# Patient Record
Sex: FEMALE | Race: OTHER | NOT HISPANIC OR LATINO | URBAN - METROPOLITAN AREA
[De-identification: names, ages, dates, MRNs, and addresses within clinical notes are randomized per-mention and may not be internally consistent; named-entity substitution may affect disease eponyms.]

---

## 2020-09-14 ENCOUNTER — EMERGENCY (EMERGENCY)
Facility: HOSPITAL | Age: 30
LOS: 1 days | Discharge: HOME | End: 2020-09-14
Attending: EMERGENCY MEDICINE
Payer: COMMERCIAL

## 2020-09-14 VITALS
HEART RATE: 78 BPM | HEIGHT: 63 IN | TEMPERATURE: 98 F | DIASTOLIC BLOOD PRESSURE: 75 MMHG | OXYGEN SATURATION: 99 % | SYSTOLIC BLOOD PRESSURE: 126 MMHG | RESPIRATION RATE: 18 BRPM

## 2020-09-14 PROCEDURE — 99283 EMERGENCY DEPT VISIT LOW MDM: CPT | Mod: 25

## 2020-09-14 PROCEDURE — 73130 X-RAY EXAM OF HAND: CPT

## 2020-09-14 PROCEDURE — 73130 X-RAY EXAM OF HAND: CPT | Mod: 26,RT

## 2020-09-14 PROCEDURE — 29130 APPL FINGER SPLINT STATIC: CPT | Mod: F5

## 2020-09-14 RX ORDER — IBUPROFEN 200 MG
600 TABLET ORAL ONCE
Refills: 0 | Status: COMPLETED | OUTPATIENT
Start: 2020-09-14 | End: 2020-09-14

## 2020-09-14 RX ADMIN — Medication 600 MILLIGRAM(S): at 13:31

## 2020-09-14 NOTE — ED PROVIDER NOTE - PHYSICAL EXAMINATION
Right thumb full range of motion   No joint laxity at the MCP or the interphalangeal joint  Flexion and extension intact against resistance  Capillary refill <2 seconds  No erythema, induration, streaking, or open wounds   No distal radial or distal ulnar tenderness  Positive Finkelstein test

## 2020-09-14 NOTE — ED PROVIDER NOTE - PATIENT PORTAL LINK FT
You can access the FollowMyHealth Patient Portal offered by [facility] by registering at the following website: [email or web address]. By joining [facility]’s FollowMyHealth portal, you will also be able to view your health information using other applications (apps) compatible with our system.

## 2020-09-14 NOTE — ED PROVIDER NOTE - PROGRESS NOTE DETAILS
XR negative. Thumb spica applied. Dx thumb sprain. Will dc. Patient will follow up with [facility] tomorrow. Pt is well appearing walking with steady gait, stable for discharge and follow up without fail with medical doctor. I had a detailed discussion with the patient and/or guardian regarding the historical points, exam findings, and any diagnostic results supporting the discharge diagnosis. Pt educated on care and need for follow up. Strict return instructions and red flag signs and symptoms discussed with patient. Questions answered. Pt shows understanding of discharge information and agrees to follow. Diego Hampton was the scribe working with Dr. Camejo on 9/14/2020. I, Beckie Melton, am attesting to the fact that Diego Hampton worked with Dr. Srikanth Camejo on 9/14/2020 providing scribe services.

## 2020-09-14 NOTE — ED PROVIDER NOTE - CARE PLAN
Principal Discharge DX:	Sprain of metacarpophalangeal (MCP) joint of right thumb, initial encounter

## 2020-09-14 NOTE — ED PROVIDER NOTE - ATTENDING CONTRIBUTION TO CARE
I completed an independent physical examination.   I have signed out the follow up of any pending tests (i.e. labs, radiological studies) to the PA/NP.  I have discussed the patient’s plan of care and disposition with the PA/NP    Patient with thumb injury. X-ray, splint, outpatient follow up.

## 2020-09-14 NOTE — ED PROVIDER NOTE - CLINICAL SUMMARY MEDICAL DECISION MAKING FREE TEXT BOX
Neurovascularly intact. History and exam suggestive of a thumb sprain at the MCP. Low suspicion of a ligament tear. Will obtain x-ray to rule out a fracture, apply thumb SPICA, give Ibuprofen for pain, and will discharge.

## 2020-09-14 NOTE — ED ADULT NURSE NOTE - CHIEF COMPLAINT QUOTE
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

## 2020-09-14 NOTE — ED ADULT TRIAGE NOTE - CHIEF COMPLAINT QUOTE
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

## 2020-09-14 NOTE — ED PROVIDER NOTE - NSFOLLOWUPINSTRUCTIONS_ED_ALL_ED_FT
Follow up with the [facility] occupational health tomorrow. If pain persists after 10 days you should see an orthopedist.  For pain you can take over the counter Ibuprofen 600 mg orally every 6 hours as needed for pain. Take medication with food.   If you experience any new or worsening symptoms or if you are concerned you can always come back to the emergency for a re-evaluation. Follow up with the [facility] occupational health tomorrow. If pain persists after 10 days you should see an orthopedist.  For pain you can take over the counter Ibuprofen 600 mg orally every 6 hours as needed for pain. Take medication with food.   If you experience any new or worsening symptoms or if you are concerned you can always come back to the emergency for a re-evaluation.      Thumb Sprain       A thumb sprain is an injury to one of the bands of tissue (ligaments) that connect the bones in your thumb. The ligament may be stretched too much, or it may be torn. A tear can be either partial or complete. How bad, or severe, the sprain is depends on how much of the ligament was damaged or torn.      What are the causes?    A thumb sprain is often caused by a fall or an accident, such as when you hold your hands out to catch something or to protect yourself.      What increases the risk?  This injury is more likely to occur in people who play sports that involve:  •A risk of falling, such as skiing.      •Catching an object, such as basketball.        What are the signs or symptoms?  Symptoms of this condition include:  •Not being able to move the thumb normally.      •Swelling.      •Tenderness.      •Bruising.        How is this diagnosed?  This condition may be diagnosed based on:  •Your symptoms and medical history. Your health care provider may ask about any recent injuries to your thumb.      • A physical exam.       •Imaging studies such as X-ray, ultrasound, or MRI.        How is this treated?  Treatment for this condition depends on how severe your sprain is.  •If your ligament is overstretched or partially torn, treatment usually involves keeping your thumb in a fixed position (immobilization) for at least 4 to 6 weeks. Your health care provider will apply a bandage, cast, or splint to keep your thumb from moving until it heals.      •If your ligament is fully torn, you may need surgery to reconnect the ligament to the bone. After surgery, you will need to wear a cast or splint on your thumb.      Your health care provider may also recommend physical therapy to strengthen your thumb.      Follow these instructions at home:    If you have a splint or bandage:     •Wear the splint or bandage as told by your health care provider. Remove it only as told by your health care provider.      •Loosen the splint or bandage if your thumb or fingers tingle, become numb, or turn cold and blue.       •Keep the splint or bandage clean and dry.      If you have a cast:     • Do not stick anything inside the cast to scratch your skin. Doing that increases your risk of infection.      •Check the skin around the cast every day. Tell your health care provider about any concerns.       •You may put lotion on dry skin around the edges of the cast. Do not put lotion on the skin underneath the cast.       •Keep the cast clean and dry.      Bathing     • Do not take baths, swim, or use a hot tub until your health care provider approves. Ask your health care provider if you may take showers. You may only be allowed to take sponge baths.    •If your splint, bandage, or cast is not waterproof:  •Do not let it get wet.      •Cover it with a watertight covering to protect it from water when you take a bath or shower.          Managing pain, stiffness, and swelling    •If directed, put ice on your thumb:  •If you have a removable splint, remove it as told by your health care provider.      •Put ice in a plastic bag.      •Place a towel between your skin and the bag, or between your cast and the bag.      •Leave the ice on for 20 minutes, 2–3 times a day.        •Move your fingers often to avoid stiffness and to lessen swelling.      •Raise (elevate) your hand above the level of your heart while you are sitting or lying down.      Activity     •Return to your normal activities as told by your health care provider. Ask your health care provider what activities are safe for you.     •Do physical therapy exercises as directed. After your splint or cast is removed, your health care provider may recommend that you:  •Move your thumb in circles.      • Touch your thumb to your pinky finger.      • Do these exercises several times a day.         •Ask your health care provider if you may use a hand exerciser to strengthen your muscles.       •If your thumb feels stiff while you are exercising it, try doing the exercises while soaking your hand in warm water.      Driving     • Do not drive until your health care provider approves.       • Do not drive or use heavy machinery while taking prescription pain medicine.      General instructions     • Do not put pressure on any part of the cast or splint until it is fully hardened, if applicable. This may take several hours.      • Take over-the-counter and prescription medicines only as told by your health care provider.       • Do not use any products that contain nicotine or tobacco, such as cigarettes and e-cigarettes. These can delay healing. If you need help quitting, ask your health care provider.      • Do not wear rings on your injured thumb.      • Keep all follow-up visits as told by your health care provider. This is important.        Contact a health care provider if you have:    •Pain that gets worse or does not get better with medicine.      •Bruising or swelling that gets worse.      •Your cast or splint is damaged.        Get help right away if:    •Your thumb feels numb, tingles, turns cold, or turns blue, even after loosening your splint or bandage (if applicable).        Summary    •A thumb sprain is an injury to one of the bands of tissue (ligaments) that connect the bones in your thumb.      •Thumb sprains are more likely to occur in people who play sports that involve a risk of falling or having to catch an object.      •Treatment will depend on how severe the sprain is, but it will require keeping the thumb in a fixed position. It might require surgery.      •Make sure you understand and follow all of your health care provider's instructions for home care.      This information is not intended to replace advice given to you by your health care provider. Make sure you discuss any questions you have with your health care provider.

## 2021-03-19 ENCOUNTER — EMERGENCY (EMERGENCY)
Facility: HOSPITAL | Age: 31
LOS: 1 days | Discharge: HOME | End: 2021-03-19
Attending: EMERGENCY MEDICINE
Payer: COMMERCIAL

## 2021-03-19 VITALS
WEIGHT: 160.06 LBS | OXYGEN SATURATION: 98 % | HEART RATE: 74 BPM | SYSTOLIC BLOOD PRESSURE: 131 MMHG | RESPIRATION RATE: 16 BRPM | HEIGHT: 63 IN | TEMPERATURE: 98 F | DIASTOLIC BLOOD PRESSURE: 82 MMHG

## 2021-03-19 PROCEDURE — 99283 EMERGENCY DEPT VISIT LOW MDM: CPT

## 2021-03-19 PROCEDURE — 90471 IMMUNIZATION ADMIN: CPT

## 2021-03-19 PROCEDURE — 99283 EMERGENCY DEPT VISIT LOW MDM: CPT | Mod: 25

## 2021-03-19 PROCEDURE — 73140 X-RAY EXAM OF FINGER(S): CPT | Mod: 26,RT

## 2021-03-19 PROCEDURE — 73140 X-RAY EXAM OF FINGER(S): CPT

## 2021-03-19 PROCEDURE — 90715 TDAP VACCINE 7 YRS/> IM: CPT

## 2021-03-19 RX ORDER — ACETAMINOPHEN 500 MG
650 TABLET ORAL ONCE
Refills: 0 | Status: COMPLETED | OUTPATIENT
Start: 2021-03-19 | End: 2021-03-19

## 2021-03-19 RX ORDER — TETANUS TOXOID, REDUCED DIPHTHERIA TOXOID AND ACELLULAR PERTUSSIS VACCINE, ADSORBED 5; 2.5; 8; 8; 2.5 [IU]/.5ML; [IU]/.5ML; UG/.5ML; UG/.5ML; UG/.5ML
0.5 SUSPENSION INTRAMUSCULAR ONCE
Refills: 0 | Status: COMPLETED | OUTPATIENT
Start: 2021-03-19 | End: 2021-03-19

## 2021-03-19 RX ADMIN — TETANUS TOXOID, REDUCED DIPHTHERIA TOXOID AND ACELLULAR PERTUSSIS VACCINE, ADSORBED 0.5 MILLILITER(S): 5; 2.5; 8; 8; 2.5 SUSPENSION INTRAMUSCULAR at 09:42

## 2021-03-19 RX ADMIN — Medication 650 MILLIGRAM(S): at 09:41

## 2021-03-19 NOTE — ED ADULT NURSE NOTE - NSIMPLEMENTINTERV_GEN_ALL_ED
Implemented All Universal Safety Interventions:  [place] to call system. Call bell, personal items and telephone within reach. Instruct patient to call for assistance. Room bathroom lighting operational. Non-slip footwear when patient is off stretcher. Physically safe environment: no spills, clutter or unnecessary equipment. Stretcher in lowest position, wheels locked, appropriate side rails in place.

## 2021-03-19 NOTE — ED PROVIDER NOTE - NSFOLLOWUPINSTRUCTIONS_ED_ALL_ED_FT
Please use acetaminophen or ibuprofen as needed for pain.  Please ice and elevate the wound.  Please return to the emergency department if you have severe worsening pain or any other symptoms.      Finger Laceration    WHAT YOU NEED TO KNOW:    A finger laceration is a deep cut in your skin. Your blood vessels, bones, joints, tendons, or nerves may also be injured.    DISCHARGE INSTRUCTIONS:    Return to the emergency department if:   •Your wound comes apart.      •Blood soaks through your bandage.      •You have severe pain in your finger or hand.      •Your finger is pale and cold.      •You have sudden trouble moving your finger.      •Your swelling suddenly gets worse.      •You have red streaks on your skin coming from your wound.      Call your doctor or hand specialist if:   •You have new numbness or tingling.      •Your finger feels warm, looks swollen or red, and is draining pus.      •You have a fever.      •You have questions or concerns about your condition or care.      Medicines: You may need any of the following:   •Antibiotics help prevent a bacterial infection.       •Acetaminophen decreases pain and fever. It is available without a doctor's order. Ask how much to take and how often to take it. Follow directions. Read the labels of all other medicines you are using to see if they also contain acetaminophen, or ask your doctor or pharmacist. Acetaminophen can cause liver damage if not taken correctly. Do not use more than 4 grams (4,000 milligrams) total of acetaminophen in one day.       •Prescription pain medicine may be given. Ask your healthcare provider how to take this medicine safely. Some prescription pain medicines contain acetaminophen. Do not take other medicines that contain acetaminophen without talking to your healthcare provider. Too much acetaminophen may cause liver damage. Prescription pain medicine may cause constipation. Ask your healthcare provider how to prevent or treat constipation.       •Take your medicine as directed. Contact your healthcare provider if you think your medicine is not helping or if you have side effects. Tell him or her if you are allergic to any medicine. Keep a list of the medicines, vitamins, and herbs you take. Include the amounts, and when and why you take them. Bring the list or the pill bottles to follow-up visits. Carry your medicine list with you in case of an emergency.      Self-care:   •Apply ice on your finger for 15 to 20 minutes every hour or as directed. Use an ice pack, or put crushed ice in a plastic bag. Cover it with a towel before you apply it to your skin. Ice helps prevent tissue damage and decreases swelling and pain.      •Elevate your hand above the level of your heart as often as you can. This will help decrease swelling and pain. Prop your hand on pillows or blankets to keep it elevated comfortably.      •Wear your splint as directed. A splint will decrease movement and stress on your wound. The splint may help your wound heal faster. Ask your healthcare provider how to apply and remove a splint.      •Apply ointments to decrease scarring. Do not apply ointments until your healthcare provider says it is okay. You may need to wait until your wound is healed. Ask which ointment to buy and how often to use it.      Wound care:   •Do not get your wound wet until your healthcare provider says it is okay. Do not soak your hand in water. Do not go swimming until your healthcare provider says it is okay. When your healthcare provider says it is okay, carefully wash around the wound with soap and water. Let soap and water run over your wound. Gently pat the area dry or allow it to air dry.      •Change your bandages when they get wet, dirty, or after washing. Apply new, clean bandages as directed. Do not apply elastic bandages or tape too tightly. Do not put powders or lotions on your wound.      •Apply antibiotic ointment as directed. Your healthcare provider may give you antibiotic ointment to put over your wound if you have stitches. If you have Strips-Strips™ over your wound, let them dry up and fall off on their own. If they do not fall off within 14 days, gently remove them. If you have glue over your wound, do not remove or pick at it. If your glue comes off, do not replace it with glue that you have at home.      •Check your wound every day for signs of infection. Signs of infection include swelling, redness, or pus.      Follow up with your doctor or hand specialist in 2 days: Write down your questions so you remember to ask them during your visits.

## 2021-03-19 NOTE — ED ADULT NURSE NOTE - OBJECTIVE STATEMENT
Pt AOx4, ambulatory, c/o laceration to 2nd finger, right hand s/p smashed with car door. Pt denies numbness/tingling.

## 2021-03-19 NOTE — ED PROVIDER NOTE - PHYSICAL EXAMINATION
Afebrile, hemodynamically stable, saturating well  NAD, well appearing, sitting comfortably in chair  Head NCAT  EOMI grossly, anicteric  Breathing comfortably on RA  AAO, CN's 3-12 grossly intact  Skin warm, well perfused, no rashes or hives  Distal phalanx 2nd finger superficial skin avulsion to dorsum, abrasion to volar aspect, small subungual bruising, no stepoff/deformity or disruption of nailbed

## 2021-03-19 NOTE — ED PROVIDER NOTE - OBJECTIVE STATEMENT
30yoF prev healthy presents with car door slammed on R 2nd distal finger this morning with swelling to the area. Unknown Tdap.

## 2021-03-19 NOTE — ED PROVIDER NOTE - PATIENT PORTAL LINK FT
You can access the FollowMyHealth Patient Portal offered by [facility] by registering at the following website: [email or web address]. By joining [employer]’s FollowMyHealth portal, you will also be able to view your health information using other applications (apps) compatible with our system.

## 2021-03-19 NOTE — ED PROVIDER NOTE - CLINICAL SUMMARY MEDICAL DECISION MAKING FREE TEXT BOX
No fx. Superficial skin avulsion/abrasion not requiring closure. Small subungual hematoma that would not benefit from trephination. No nailbed injury. Dressed with gauze. Tdap updated. Patient is well appearing, NAD, afebrile, hemodynamically stable. Any available tests and studies were discussed with patient. Discharged with instructions in further symptomatic care, return precautions, and need for PMD f/u.

## 2022-10-06 NOTE — ED PROVIDER NOTE - CONDITION AT DISCHARGE:
[FreeTextEntry1] : No complaints;\par Exercise [de-identified] : Urine no problem \par Taking medication Improved
